# Patient Record
Sex: MALE | Race: WHITE | NOT HISPANIC OR LATINO | URBAN - METROPOLITAN AREA
[De-identification: names, ages, dates, MRNs, and addresses within clinical notes are randomized per-mention and may not be internally consistent; named-entity substitution may affect disease eponyms.]

---

## 2023-08-04 ENCOUNTER — CLAIMS CREATED FROM THE CLAIM WINDOW (OUTPATIENT)
Dept: URBAN - METROPOLITAN AREA MEDICAL CENTER 34 | Facility: MEDICAL CENTER | Age: 68
End: 2023-08-04
Payer: MEDICARE

## 2023-08-04 DIAGNOSIS — K22.89 OTHER SPECIFIED DISEASE OF ESOPHAGUS: ICD-10-CM

## 2023-08-04 DIAGNOSIS — D64.89 ANEMIA DUE TO OTHER CAUSE: ICD-10-CM

## 2023-08-04 DIAGNOSIS — K22.70 BARRETT ESOPHAGUS: ICD-10-CM

## 2023-08-04 DIAGNOSIS — K92.1 ACUTE MELENA: ICD-10-CM

## 2023-08-04 PROCEDURE — 99253 IP/OBS CNSLTJ NEW/EST LOW 45: CPT | Performed by: PHYSICIAN ASSISTANT

## 2023-08-04 PROCEDURE — 43239 EGD BIOPSY SINGLE/MULTIPLE: CPT | Performed by: INTERNAL MEDICINE

## 2023-08-04 PROCEDURE — G8427 DOCREV CUR MEDS BY ELIG CLIN: HCPCS | Performed by: PHYSICIAN ASSISTANT

## 2023-08-04 PROCEDURE — 99221 1ST HOSP IP/OBS SF/LOW 40: CPT | Performed by: PHYSICIAN ASSISTANT

## 2023-08-04 PROCEDURE — 43235 EGD DIAGNOSTIC BRUSH WASH: CPT | Performed by: PHYSICIAN ASSISTANT

## 2023-08-07 ENCOUNTER — WEB ENCOUNTER (OUTPATIENT)
Dept: URBAN - METROPOLITAN AREA CLINIC 94 | Facility: CLINIC | Age: 68
End: 2023-08-07

## 2023-08-17 ENCOUNTER — WEB ENCOUNTER (OUTPATIENT)
Dept: URBAN - METROPOLITAN AREA CLINIC 94 | Facility: CLINIC | Age: 68
End: 2023-08-17

## 2023-08-23 ENCOUNTER — OFFICE VISIT (OUTPATIENT)
Dept: URBAN - METROPOLITAN AREA CLINIC 94 | Facility: CLINIC | Age: 68
End: 2023-08-23
Payer: MEDICARE

## 2023-08-23 ENCOUNTER — CLAIMS CREATED FROM THE CLAIM WINDOW (OUTPATIENT)
Dept: URBAN - METROPOLITAN AREA CLINIC 94 | Facility: CLINIC | Age: 68
End: 2023-08-23

## 2023-08-23 VITALS
HEART RATE: 97 BPM | HEIGHT: 68 IN | TEMPERATURE: 97.5 F | DIASTOLIC BLOOD PRESSURE: 78 MMHG | WEIGHT: 182 LBS | SYSTOLIC BLOOD PRESSURE: 115 MMHG | BODY MASS INDEX: 27.58 KG/M2

## 2023-08-23 DIAGNOSIS — D62 ACUTE BLOOD LOSS ANEMIA: ICD-10-CM

## 2023-08-23 DIAGNOSIS — K21.01 GASTROESOPHAGEAL REFLUX DISEASE WITH ESOPHAGITIS AND HEMORRHAGE: ICD-10-CM

## 2023-08-23 DIAGNOSIS — K22.70 LONG-SEGMENT BARRETT'S ESOPHAGUS: ICD-10-CM

## 2023-08-23 PROBLEM — 266433003: Status: ACTIVE | Noted: 2023-08-23

## 2023-08-23 PROBLEM — 57748001: Status: ACTIVE | Noted: 2023-08-23

## 2023-08-23 PROBLEM — 459491000124104: Status: ACTIVE | Noted: 2023-08-23

## 2023-08-23 PROCEDURE — 99214 OFFICE O/P EST MOD 30 MIN: CPT | Performed by: INTERNAL MEDICINE

## 2023-08-23 RX ORDER — PANTOPRAZOLE SODIUM 40 MG/1
1 TABLET TABLET, DELAYED RELEASE ORAL TWICE A DAY
Status: ACTIVE | COMMUNITY

## 2023-08-23 RX ORDER — GLIMEPIRIDE 1 MG/1
1 TABLET WITH BREAKFAST OR THE FIRST MAIN MEAL OF THE DAY TABLET ORAL ONCE A DAY
Status: ACTIVE | COMMUNITY

## 2023-08-23 RX ORDER — AMLODIPINE BESYLATE 10 MG/1
1 TABLET TABLET ORAL ONCE A DAY
Status: ACTIVE | COMMUNITY

## 2023-08-23 RX ORDER — ROSUVASTATIN CALCIUM 10 MG/1
1 TABLET TABLET, COATED ORAL ONCE A DAY
Status: ACTIVE | COMMUNITY

## 2023-08-23 RX ORDER — FERROUS SULFATE 325(65) MG
1 TABLET TABLET ORAL
Status: ACTIVE | COMMUNITY

## 2023-08-23 NOTE — HPI-TODAY'S VISIT:
68 y/o M with hx of alzheimer's dementia, DM, recent L TKR(07/20) here for f/u after recent admission for melena with anemia 2/2 Grade D esophagitis    Patient admitted this August to [facility]. Hgb drop from 13 to 7's in 2 weeks. Did use NSAID a couple of days. EGD with distal esophageal ulcers(Grade D esophagitis) likely source of prior bleed. Most recent Hgb last week improved to 11.2 on PO iron. Today denies any GIB, and is complaint with pantoprazole BID   EGD 08/2023: Long segment of salmon colored mucosa 30-35cm, 4 distal esophageal ulcers with largest 1cm consistent with Grade D esophagitis s/p cold forceps biopsy, medium HH, rest normal. Biopsies confirm ulcreation and Angela's esophagus Colonoscopy 02/2017- diverticulosis and IH. Repeat in 10 years

## 2023-09-05 ENCOUNTER — OUT OF OFFICE VISIT (OUTPATIENT)
Dept: URBAN - METROPOLITAN AREA SURGERY CENTER 17 | Facility: SURGERY CENTER | Age: 68
End: 2023-09-05
Payer: MEDICARE

## 2023-09-05 ENCOUNTER — CLAIMS CREATED FROM THE CLAIM WINDOW (OUTPATIENT)
Dept: URBAN - METROPOLITAN AREA CLINIC 4 | Facility: CLINIC | Age: 68
End: 2023-09-05
Payer: MEDICARE

## 2023-09-05 DIAGNOSIS — K22.70 BARRETT ESOPHAGUS: ICD-10-CM

## 2023-09-05 DIAGNOSIS — K29.70 GASTRITIS, UNSPECIFIED, WITHOUT BLEEDING: ICD-10-CM

## 2023-09-05 DIAGNOSIS — K22.70 LONG-SEGMENT BARRETT'S ESOPHAGUS: ICD-10-CM

## 2023-09-05 DIAGNOSIS — K63.9 MUCOSAL ABNORMALITY OF COLON: ICD-10-CM

## 2023-09-05 DIAGNOSIS — K31.89 ACHYLIA: ICD-10-CM

## 2023-09-05 DIAGNOSIS — K44.9 HIATAL HERNIA: ICD-10-CM

## 2023-09-05 PROCEDURE — 88305 TISSUE EXAM BY PATHOLOGIST: CPT | Performed by: PATHOLOGY

## 2023-09-05 PROCEDURE — 43239 EGD BIOPSY SINGLE/MULTIPLE: CPT | Performed by: INTERNAL MEDICINE

## 2023-09-05 PROCEDURE — G8907 PT DOC NO EVENTS ON DISCHARG: HCPCS | Performed by: INTERNAL MEDICINE

## 2023-09-05 PROCEDURE — 88312 SPECIAL STAINS GROUP 1: CPT | Performed by: PATHOLOGY

## 2023-09-05 PROCEDURE — 00731 ANES UPR GI NDSC PX NOS: CPT | Performed by: NURSE ANESTHETIST, CERTIFIED REGISTERED

## 2023-09-13 ENCOUNTER — TELEPHONE ENCOUNTER (OUTPATIENT)
Dept: URBAN - METROPOLITAN AREA CLINIC 94 | Facility: CLINIC | Age: 68
End: 2023-09-13

## 2023-09-13 RX ORDER — FERROUS SULFATE 325(65) MG
1 TABLET TABLET ORAL
Qty: 30 | Refills: 3

## 2023-09-15 ENCOUNTER — OFFICE VISIT (OUTPATIENT)
Dept: URBAN - METROPOLITAN AREA CLINIC 94 | Facility: CLINIC | Age: 68
End: 2023-09-15

## 2023-09-22 ENCOUNTER — DASHBOARD ENCOUNTERS (OUTPATIENT)
Age: 68
End: 2023-09-22

## 2023-09-22 ENCOUNTER — OFFICE VISIT (OUTPATIENT)
Dept: URBAN - METROPOLITAN AREA CLINIC 94 | Facility: CLINIC | Age: 68
End: 2023-09-22
Payer: MEDICARE

## 2023-09-22 VITALS
HEIGHT: 68 IN | DIASTOLIC BLOOD PRESSURE: 73 MMHG | TEMPERATURE: 97.2 F | SYSTOLIC BLOOD PRESSURE: 121 MMHG | WEIGHT: 182 LBS | BODY MASS INDEX: 27.58 KG/M2 | HEART RATE: 91 BPM

## 2023-09-22 DIAGNOSIS — K21.01 GASTROESOPHAGEAL REFLUX DISEASE WITH ESOPHAGITIS AND HEMORRHAGE: ICD-10-CM

## 2023-09-22 DIAGNOSIS — D62 ACUTE BLOOD LOSS ANEMIA: ICD-10-CM

## 2023-09-22 DIAGNOSIS — K22.70 LONG-SEGMENT BARRETT'S ESOPHAGUS: ICD-10-CM

## 2023-09-22 PROCEDURE — 99213 OFFICE O/P EST LOW 20 MIN: CPT | Performed by: INTERNAL MEDICINE

## 2023-09-22 RX ORDER — GLIMEPIRIDE 1 MG/1
1 TABLET WITH BREAKFAST OR THE FIRST MAIN MEAL OF THE DAY TABLET ORAL ONCE A DAY
Status: ACTIVE | COMMUNITY

## 2023-09-22 RX ORDER — ROSUVASTATIN CALCIUM 10 MG/1
1 TABLET TABLET, COATED ORAL ONCE A DAY
Status: ACTIVE | COMMUNITY

## 2023-09-22 RX ORDER — FERROUS SULFATE 325(65) MG
1 TABLET TABLET ORAL
Qty: 30 | Refills: 3 | Status: ACTIVE | COMMUNITY

## 2023-09-22 RX ORDER — AMLODIPINE BESYLATE 10 MG/1
1 TABLET TABLET ORAL ONCE A DAY
Status: ACTIVE | COMMUNITY

## 2023-09-22 RX ORDER — PANTOPRAZOLE SODIUM 40 MG/1
1 TABLET TABLET, DELAYED RELEASE ORAL TWICE A DAY
Status: ACTIVE | COMMUNITY

## 2023-09-22 NOTE — HPI-TODAY'S VISIT:
67 y/o M with hx of alzheimer's dementia, DM, L TKR(07/2023), GERD c/b long segment Mccall's esophagus, GIB 2/2 Grade D esophagitis,  here for f/u   Patient admitted this August to [facility]. Hgb drop from 13 to 7's in 2 weeks. Did use NSAID a couple of days. EGD with distal esophageal ulcers(Grade D esophagitis) likely source of prior bleed, and long segment Mccall's. Last Hgb improved to 11.2 on PO iron. Did undergo repeat EGD for Mccall's segmental biopsies.    Today denies any GIB, and is complaint with pantoprazole BID. Does occaisonally have solid black stools on PO iron   EGD 09/2023: Long segment Mccall's 31-36cm, medium HH, localied gastric antral erythema. Biopsies mccall's w/o dysplasia, gastritis w/o H.pylori. Repeat in 3 years EGD 08/2023: Long segment of salmon colored mucosa 30-35cm, 4 distal esophageal ulcers with largest 1cm consistent with Grade D esophagitis s/p cold forceps biopsy, medium HH, rest normal. Biopsies confirm ulcreation and Mccall's esophagus Colonoscopy 02/2017- diverticulosis and IH. Repeat in 10 years

## 2023-09-23 LAB
HEMATOCRIT: 40.2
HEMOGLOBIN: 13
MCH: 28.9
MCHC: 32.3
MCV: 89.3
MPV: 11.1
PLATELET COUNT: 356
RDW: 13.5
RED BLOOD CELL COUNT: 4.5
WHITE BLOOD CELL COUNT: 6.4

## 2024-09-17 ENCOUNTER — OFFICE VISIT (OUTPATIENT)
Dept: URBAN - METROPOLITAN AREA CLINIC 94 | Facility: CLINIC | Age: 69
End: 2024-09-17
Payer: MEDICARE

## 2024-09-17 VITALS
TEMPERATURE: 98.6 F | WEIGHT: 191 LBS | DIASTOLIC BLOOD PRESSURE: 85 MMHG | BODY MASS INDEX: 28.95 KG/M2 | HEIGHT: 68 IN | SYSTOLIC BLOOD PRESSURE: 124 MMHG | HEART RATE: 83 BPM

## 2024-09-17 DIAGNOSIS — K21.01 GASTROESOPHAGEAL REFLUX DISEASE WITH ESOPHAGITIS AND HEMORRHAGE: ICD-10-CM

## 2024-09-17 DIAGNOSIS — K22.70 LONG-SEGMENT BARRETT'S ESOPHAGUS: ICD-10-CM

## 2024-09-17 PROCEDURE — 99213 OFFICE O/P EST LOW 20 MIN: CPT | Performed by: INTERNAL MEDICINE

## 2024-09-17 RX ORDER — GLIMEPIRIDE 1 MG/1
1 TABLET WITH BREAKFAST OR THE FIRST MAIN MEAL OF THE DAY TABLET ORAL ONCE A DAY
Status: ACTIVE | COMMUNITY

## 2024-09-17 RX ORDER — ROSUVASTATIN CALCIUM 10 MG/1
1 TABLET TABLET, COATED ORAL ONCE A DAY
Status: ACTIVE | COMMUNITY

## 2024-09-17 RX ORDER — FERROUS SULFATE 325(65) MG
1 TABLET TABLET ORAL
Qty: 30 | Refills: 3 | Status: ON HOLD | COMMUNITY

## 2024-09-17 RX ORDER — PANTOPRAZOLE SODIUM 40 MG/1
1 TABLET TABLET, DELAYED RELEASE ORAL TWICE A DAY
Status: ON HOLD | COMMUNITY

## 2024-09-17 RX ORDER — PANTOPRAZOLE SODIUM 40 MG/1
1 TABLET TABLET, DELAYED RELEASE ORAL
Qty: 90 TABLET | Refills: 3

## 2024-09-17 RX ORDER — AMLODIPINE BESYLATE 10 MG/1
1 TABLET TABLET ORAL ONCE A DAY
Status: ACTIVE | COMMUNITY

## 2024-09-17 NOTE — HPI-TODAY'S VISIT:
70 y/o M with hx of alzheimer's dementia, DM, L TKR(07/2023), GERD c/b long segment Mccall's esophagus, GIB 2/2 Grade D esophagitis,  here for f/u   Patient admitted August 2023 to [facility]. Hgb drop from 13 to 7's in 2 weeks. Did use NSAID a couple of days. EGD with distal esophageal ulcers(Grade D esophagitis) likely source of prior bleed, and long segment Mccall's. Did undergo repeat EGD for Mccall's segmental biopsies after BID PPI course. Most recent Hgb normal(15.5) this May 2024.    Today reports doing well though has had some heartburn at times requiring antacids. Was eventually switched to daily pantoprazole, though it seems he ran out and never got a refill  EGD 09/2023: Long segment Mccall's 31-36cm, medium HH, localied gastric antral erythema. Biopsies mccall's w/o dysplasia, gastritis w/o H.pylori. Repeat in 3 years EGD 08/2023: Long segment of salmon colored mucosa 30-35cm, 4 distal esophageal ulcers with largest 1cm consistent with Grade D esophagitis s/p cold forceps biopsy, medium HH, rest normal. Biopsies confirm ulcreation and Mccall's esophagus Colonoscopy 02/2017- diverticulosis and IH. Repeat in 10 years

## 2024-10-29 ENCOUNTER — TELEPHONE ENCOUNTER (OUTPATIENT)
Dept: URBAN - METROPOLITAN AREA CLINIC 6 | Facility: CLINIC | Age: 69
End: 2024-10-29